# Patient Record
Sex: FEMALE | Race: WHITE | HISPANIC OR LATINO | URBAN - METROPOLITAN AREA
[De-identification: names, ages, dates, MRNs, and addresses within clinical notes are randomized per-mention and may not be internally consistent; named-entity substitution may affect disease eponyms.]

---

## 2021-04-09 ENCOUNTER — IMPORTED ENCOUNTER (OUTPATIENT)
Dept: URBAN - METROPOLITAN AREA CLINIC 31 | Facility: CLINIC | Age: 41
End: 2021-04-09

## 2021-04-09 PROBLEM — T26.31XA: Noted: 2021-04-09

## 2021-04-09 PROCEDURE — 99203 OFFICE O/P NEW LOW 30 MIN: CPT
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
## 2022-04-02 ASSESSMENT — VISUAL ACUITY
OD_PH: SC 20/25 -1
OD_CC: 20/40
OS_CC: 20/20

## 2022-04-21 ENCOUNTER — ESTABLISHED PATIENT (OUTPATIENT)
Dept: URBAN - METROPOLITAN AREA CLINIC 31 | Facility: CLINIC | Age: 42
End: 2022-04-21

## 2022-04-21 DIAGNOSIS — H00.12: ICD-10-CM

## 2022-04-21 PROCEDURE — 92014 COMPRE OPH EXAM EST PT 1/>: CPT

## 2022-04-21 PROCEDURE — 92015 DETERMINE REFRACTIVE STATE: CPT

## 2022-04-21 ASSESSMENT — TONOMETRY
OD_IOP_MMHG: 12
OS_IOP_MMHG: 12

## 2022-04-21 ASSESSMENT — VISUAL ACUITY
OS_SC: 20/20
OD_SC: 20/20
OD_CC: J2
OS_CC: J3

## 2022-04-21 NOTE — PATIENT DISCUSSION
Explained need for incision and drainage of chronic inflammation. Risks and benefits discussed if patient desires to proceed.